# Patient Record
Sex: MALE | Race: WHITE | NOT HISPANIC OR LATINO | URBAN - METROPOLITAN AREA
[De-identification: names, ages, dates, MRNs, and addresses within clinical notes are randomized per-mention and may not be internally consistent; named-entity substitution may affect disease eponyms.]

---

## 2018-08-16 VITALS
HEIGHT: 72 IN | SYSTOLIC BLOOD PRESSURE: 134 MMHG | WEIGHT: 229 LBS | DIASTOLIC BLOOD PRESSURE: 88 MMHG | HEART RATE: 60 BPM | TEMPERATURE: 97.9 F

## 2018-08-20 ENCOUNTER — OFFICE (OUTPATIENT)
Dept: URBAN - METROPOLITAN AREA CLINIC 33 | Facility: CLINIC | Age: 23
End: 2018-08-20

## 2018-08-20 DIAGNOSIS — K64.8 OTHER HEMORRHOIDS: ICD-10-CM

## 2018-08-20 DIAGNOSIS — R19.5 OTHER FECAL ABNORMALITIES: ICD-10-CM

## 2018-08-20 DIAGNOSIS — K62.5 HEMORRHAGE OF ANUS AND RECTUM: ICD-10-CM

## 2018-08-20 PROCEDURE — 99244 OFF/OP CNSLTJ NEW/EST MOD 40: CPT

## 2018-08-20 NOTE — SERVICEHPINOTES
JOVAN HE   is a   23   year old male who is being seen in consultation at the request of   GAVIOTA KEITH   for rectal bleeding. The patient was seen here in 2015 with similar issues and had a GI workup done including CT of abdomen, colonoscopy, EGD and u/s of abdomen. BRThe initial CT from 04/23/15 suggested mild mesenteric adenitis and the repeated CT 06/10/15 was unremarkable. The colonoscopy from 05/2015 was unremarkable other that internal hemorrhoids. The EGD 07/2015 showed chronic gastritis, no signs of celiac. The U/S of abdomen was unremarkable. The patient today presents with on and off rectal bleeding which occurs more with loose bowel movements. He usually has bowel movements up to three times daily on BSS type 4-6. The more lactose consumption, the noticeable loose bowel. He sees the blood usually when he wipes and on the toilet bowel and it is bright red. Last Wednesday, he went to the [facility] [place] with same symptoms and Anusol 25 mg suppository was prescribed and was recommended to follow up with GI. Vadim has been using the Anusol at bedtime since Wednesday and the last bleeding was noted on Friday. No bleeding since then. Denies rectal pain or itchiness. Also denies abdominal pain, nausea, vomiting, heartburn, dysphagia, or weight loss. Denies family history of colon cancer or IBD.CONOR

## 2025-07-09 ENCOUNTER — OFFICE (OUTPATIENT)
Dept: URBAN - METROPOLITAN AREA CLINIC 102 | Facility: CLINIC | Age: 30
End: 2025-07-09
Payer: COMMERCIAL

## 2025-07-09 VITALS
WEIGHT: 230 LBS | HEIGHT: 72 IN | SYSTOLIC BLOOD PRESSURE: 123 MMHG | TEMPERATURE: 98.2 F | HEART RATE: 64 BPM | DIASTOLIC BLOOD PRESSURE: 82 MMHG

## 2025-07-09 DIAGNOSIS — K76.0 FATTY (CHANGE OF) LIVER, NOT ELSEWHERE CLASSIFIED: ICD-10-CM

## 2025-07-09 DIAGNOSIS — R79.89 OTHER SPECIFIED ABNORMAL FINDINGS OF BLOOD CHEMISTRY: ICD-10-CM

## 2025-07-09 DIAGNOSIS — R19.5 OTHER FECAL ABNORMALITIES: ICD-10-CM

## 2025-07-09 DIAGNOSIS — Z78.9 OTHER SPECIFIED HEALTH STATUS: ICD-10-CM

## 2025-07-09 PROCEDURE — 99214 OFFICE O/P EST MOD 30 MIN: CPT | Performed by: REGISTERED NURSE

## 2025-07-09 RX ORDER — CHOLESTYRAMINE 4 G/5G
POWDER, FOR SUSPENSION ORAL
Qty: 60 | Refills: 2 | Status: ACTIVE
Start: 2025-07-09

## 2025-07-09 NOTE — SERVICEHPINOTES
JOVAN HE   is a   30   male who complains of loose stools. br


brReports daily lower bilateral abdominal pain. Bowel urgency within 10-20 minutes after nearly any PO intake. Green stool. br Reports resolution of rectal bleeding following use of anucort BID x10 days, nauseabr

brWas recommended to start fiber supplement after colonoscopy, did not try it. 
br Recommended hepatitis B vaccine after May labs, pt has not obtained yet. Encouraged to obtain. 
br Tried dicyclomine 4 times and didn't note benefit, so he discontinued use. br brCurrently BSS Type 5 frequency generally 3 times daily. brPrior visit: BSS type 6, was BSS type 3/4 prior to 1/2025 BM frequency at least 3-4 times daily. Denies: dysphagia, bloating, nausea, vomiting, constipation, unintentional weight changes, fever.Denies NSAID use. Denies recent antibiotic use or traveling. Denies current anticoagulant use. Denies personal CVD history. Denies family history of cancer.Reviewed colonoscopy op report with pt, due for screening at age 47. brReviewed last EGD op and path report from 7/29/2015.br